# Patient Record
Sex: FEMALE | Race: OTHER | NOT HISPANIC OR LATINO
[De-identification: names, ages, dates, MRNs, and addresses within clinical notes are randomized per-mention and may not be internally consistent; named-entity substitution may affect disease eponyms.]

---

## 2022-03-04 ENCOUNTER — RESULT REVIEW (OUTPATIENT)
Age: 57
End: 2022-03-04

## 2022-03-08 ENCOUNTER — RESULT REVIEW (OUTPATIENT)
Age: 57
End: 2022-03-08

## 2022-03-25 ENCOUNTER — NON-APPOINTMENT (OUTPATIENT)
Age: 57
End: 2022-03-25

## 2022-03-25 PROBLEM — Z00.00 ENCOUNTER FOR PREVENTIVE HEALTH EXAMINATION: Status: ACTIVE | Noted: 2022-03-25

## 2022-03-30 ENCOUNTER — NON-APPOINTMENT (OUTPATIENT)
Age: 57
End: 2022-03-30

## 2022-03-30 ENCOUNTER — APPOINTMENT (OUTPATIENT)
Dept: GYNECOLOGIC ONCOLOGY | Facility: CLINIC | Age: 57
End: 2022-03-30
Payer: COMMERCIAL

## 2022-03-30 VITALS
TEMPERATURE: 97.3 F | HEART RATE: 98 BPM | OXYGEN SATURATION: 96 % | DIASTOLIC BLOOD PRESSURE: 85 MMHG | SYSTOLIC BLOOD PRESSURE: 132 MMHG | HEIGHT: 63 IN | BODY MASS INDEX: 40.75 KG/M2 | WEIGHT: 230 LBS

## 2022-03-30 DIAGNOSIS — Z78.9 OTHER SPECIFIED HEALTH STATUS: ICD-10-CM

## 2022-03-30 DIAGNOSIS — N93.9 ABNORMAL UTERINE AND VAGINAL BLEEDING, UNSPECIFIED: ICD-10-CM

## 2022-03-30 DIAGNOSIS — R93.89 ABNORMAL FINDINGS ON DIAGNOSTIC IMAGING OF OTHER SPECIFIED BODY STRUCTURES: ICD-10-CM

## 2022-03-30 PROCEDURE — 99205 OFFICE O/P NEW HI 60 MIN: CPT

## 2022-03-30 NOTE — PHYSICAL EXAM
[Normal] : Recto-Vaginal Exam: Normal [Fully active, able to carry on all pre-disease performance without restriction] : Status 0 - Fully active, able to carry on all pre-disease performance without restriction [Chaperone Present] : A chaperone was present in the examining room during all aspects of the physical examination

## 2022-03-30 NOTE — HISTORY OF PRESENT ILLNESS
[FreeTextEntry1] : Problem\par 1) Endometrial adenocarcinoma, FIGO G2\par \par Previous Therapy\par 1) Pap 3/17/2022\par    a) PALLAVI, favor neoplastic cells HPV negative \par 2) EMB  3/8/2021\par    a) Endometrial adenocarcinoma, FIGO G2\par 3) Pelvic US 3/4/2022\par    a) Uterus 7.5cm, endometrial thickened 3.6cm, \par \par 55 yo referred by [facility] for newly diagnosed endometrial adenocarcinoma FIGO G2.  Patient went 4 years without a menses [id number], has irregular menses whole life. Has never Patient started with 6/2021, 1/2022 had very heavy bleeding with syncope. Now is having daily spotting, light bleeding. Had not been seeing gyn for many years, so presented to [facility] in march for an annual exam. \par \par \par Mammogram 2022 wnl\par colonoscopy to be done once this is evaluated.

## 2022-03-30 NOTE — DISCUSSION/SUMMARY
[Reviewed Clinical Lab Test(s)] : Results of clinical tests were reviewed. [Reviewed Radiology Report(s)] : Radiology reports were reviewed. [Pre Op] : The differential diagnosis was discussed in detail. The indications, risks, benefits and alternatives were discussed. [unfilled] expressed an understanding of the treatment rationale and her questions were answered to her apparent satisfaction. [FreeTextEntry2] : Grade 2 Endometrial Cancer [FreeTextEntry1] : I discussed with the patient with the aid of diagrams, reviewed the findings on history and physical examination, and reviewed available imaging studies in detail. Pathogenesis of endometrial cancer was explained. \par \par Recommendation is for surgical removal of the uterus, bilateral fallopian tubes and ovaries. Different surgical approaches discussed including minimally invasive and open approaches. I recommend advanced laparoscopic robotic assisted total hysterectomy and bilateral salpingo-oophorectomy with sentinel lymph node dissection. The NCCN guidelines with regards to endometrial cancer staging were discussed. If sentinel lymph node biopsy is unsuccessful, or if the sentinel lymph node is found to be positive for disease, a full lymph node dissection will be performed on that side. \par \par Complications that include, but are not limited to: bleeding, infection, injury to other organs including bowel, bladder, ureters, blood vessels, nerves; infections, blood clots, lymphedema, pneumonia, wound complications and prolonged hospital stay have all been discussed with the patient. Whenever minimally invasive surgery is attempted, there is a chance of needing to convert to laparotomy. The risk of occult injury requiring additional surgery also discussed. I have also provided her with the diagrams.  \par \par Surgical scheduling was discussed and instructions for optimization prior to surgery were given. will follow the Enhanced Recovery After Surgery (ERAS) protocol.  \par []No aspirin or NSAID products for 1 week prior. \par []She will choose a surgical date.\par []Slide review \par []medical clearance. \par Will obtain additional imaging, CT abdomen/pelvis and chest x-ray prior to surgery to assess for disease outside of the uterus.\par \par The total encounter time was 80 minutes, of which over 50% was spent face-to-face, examining and counseling the patient, or coordinating care\par

## 2022-04-04 ENCOUNTER — RESULT REVIEW (OUTPATIENT)
Age: 57
End: 2022-04-04

## 2022-04-06 ENCOUNTER — RESULT REVIEW (OUTPATIENT)
Age: 57
End: 2022-04-06

## 2022-04-06 ENCOUNTER — APPOINTMENT (OUTPATIENT)
Dept: CT IMAGING | Facility: CLINIC | Age: 57
End: 2022-04-06

## 2022-04-06 ENCOUNTER — OUTPATIENT (OUTPATIENT)
Dept: OUTPATIENT SERVICES | Facility: HOSPITAL | Age: 57
LOS: 1 days | End: 2022-04-06

## 2022-04-06 ENCOUNTER — OUTPATIENT (OUTPATIENT)
Dept: OUTPATIENT SERVICES | Facility: HOSPITAL | Age: 57
LOS: 1 days | End: 2022-04-06
Payer: COMMERCIAL

## 2022-04-06 DIAGNOSIS — R93.89 ABNORMAL FINDINGS ON DIAGNOSTIC IMAGING OF OTHER SPECIFIED BODY STRUCTURES: ICD-10-CM

## 2022-04-06 DIAGNOSIS — N93.9 ABNORMAL UTERINE AND VAGINAL BLEEDING, UNSPECIFIED: ICD-10-CM

## 2022-04-06 DIAGNOSIS — C54.1 MALIGNANT NEOPLASM OF ENDOMETRIUM: ICD-10-CM

## 2022-04-06 PROCEDURE — 74177 CT ABD & PELVIS W/CONTRAST: CPT | Mod: 26

## 2022-04-07 ENCOUNTER — NON-APPOINTMENT (OUTPATIENT)
Age: 57
End: 2022-04-07

## 2022-04-08 LAB — SURGICAL PATHOLOGY STUDY: SIGNIFICANT CHANGE UP

## 2022-04-16 ENCOUNTER — LABORATORY RESULT (OUTPATIENT)
Age: 57
End: 2022-04-16

## 2022-04-18 ENCOUNTER — TRANSCRIPTION ENCOUNTER (OUTPATIENT)
Age: 57
End: 2022-04-18

## 2022-04-18 VITALS
HEIGHT: 63 IN | TEMPERATURE: 98 F | DIASTOLIC BLOOD PRESSURE: 79 MMHG | WEIGHT: 236.34 LBS | RESPIRATION RATE: 16 BRPM | SYSTOLIC BLOOD PRESSURE: 127 MMHG | OXYGEN SATURATION: 100 % | HEART RATE: 83 BPM

## 2022-04-18 RX ORDER — DOCUSATE SODIUM 100 MG/1
100 CAPSULE ORAL TWICE DAILY
Qty: 20 | Refills: 1 | Status: ACTIVE | COMMUNITY
Start: 2022-04-18 | End: 1900-01-01

## 2022-04-18 RX ORDER — OXYCODONE AND ACETAMINOPHEN 5; 325 MG/1; MG/1
5-325 TABLET ORAL
Qty: 6 | Refills: 0 | Status: ACTIVE | COMMUNITY
Start: 2022-04-18 | End: 1900-01-01

## 2022-04-18 RX ORDER — IBUPROFEN 800 MG/1
800 TABLET, FILM COATED ORAL 3 TIMES DAILY
Qty: 30 | Refills: 0 | Status: ACTIVE | COMMUNITY
Start: 2022-04-18 | End: 1900-01-01

## 2022-04-18 NOTE — ASU PATIENT PROFILE, ADULT - REASON FOR ADMISSION, PROFILE
robotic assisted total hysterectomy B/L salpingo oophorectomy sentinely lymph node biopses pelvic para aortic

## 2022-04-18 NOTE — ASU PATIENT PROFILE, ADULT - FALL HARM RISK - UNIVERSAL INTERVENTIONS
Bed in lowest position, wheels locked, appropriate side rails in place/Call bell, personal items and telephone in reach/Instruct patient to call for assistance before getting out of bed or chair/Non-slip footwear when patient is out of bed/[place] to call system/Physically safe environment - no spills, clutter or unnecessary equipment/Purposeful Proactive Rounding/Room/bathroom lighting operational, light cord in reach

## 2022-04-19 ENCOUNTER — TRANSCRIPTION ENCOUNTER (OUTPATIENT)
Age: 57
End: 2022-04-19

## 2022-04-19 ENCOUNTER — RESULT REVIEW (OUTPATIENT)
Age: 57
End: 2022-04-19

## 2022-04-19 ENCOUNTER — APPOINTMENT (OUTPATIENT)
Dept: GYNECOLOGIC ONCOLOGY | Facility: HOSPITAL | Age: 57
End: 2022-04-19

## 2022-04-19 ENCOUNTER — OUTPATIENT (OUTPATIENT)
Dept: OUTPATIENT SERVICES | Facility: HOSPITAL | Age: 57
LOS: 1 days | Discharge: HOME | End: 2022-04-19
Payer: COMMERCIAL

## 2022-04-19 VITALS
OXYGEN SATURATION: 100 % | HEART RATE: 98 BPM | SYSTOLIC BLOOD PRESSURE: 150 MMHG | DIASTOLIC BLOOD PRESSURE: 89 MMHG | RESPIRATION RATE: 25 BRPM

## 2022-04-19 DIAGNOSIS — Z98.891 HISTORY OF UTERINE SCAR FROM PREVIOUS SURGERY: Chronic | ICD-10-CM

## 2022-04-19 LAB
BLD GP AB SCN SERPL QL: NEGATIVE — SIGNIFICANT CHANGE UP
GLUCOSE BLDC GLUCOMTR-MCNC: 119 MG/DL — HIGH (ref 70–99)
RH IG SCN BLD-IMP: POSITIVE — SIGNIFICANT CHANGE UP

## 2022-04-19 PROCEDURE — 88304 TISSUE EXAM BY PATHOLOGIST: CPT | Mod: 26

## 2022-04-19 PROCEDURE — 86901 BLOOD TYPING SEROLOGIC RH(D): CPT

## 2022-04-19 PROCEDURE — 88331 PATH CONSLTJ SURG 1 BLK 1SPC: CPT | Mod: 26

## 2022-04-19 PROCEDURE — 88342 IMHCHEM/IMCYTCHM 1ST ANTB: CPT | Mod: 26,59

## 2022-04-19 PROCEDURE — 88305 TISSUE EXAM BY PATHOLOGIST: CPT

## 2022-04-19 PROCEDURE — 82962 GLUCOSE BLOOD TEST: CPT

## 2022-04-19 PROCEDURE — 86900 BLOOD TYPING SEROLOGIC ABO: CPT

## 2022-04-19 PROCEDURE — 38572 LAPAROSCOPY LYMPHADENECTOMY: CPT | Mod: AS

## 2022-04-19 PROCEDURE — 88112 CYTOPATH CELL ENHANCE TECH: CPT | Mod: 26

## 2022-04-19 PROCEDURE — 38900 IO MAP OF SENT LYMPH NODE: CPT

## 2022-04-19 PROCEDURE — 38900 IO MAP OF SENT LYMPH NODE: CPT | Mod: AS

## 2022-04-19 PROCEDURE — 58575 LAPS TOT HYST RESJ MAL: CPT

## 2022-04-19 PROCEDURE — 88360 TUMOR IMMUNOHISTOCHEM/MANUAL: CPT

## 2022-04-19 PROCEDURE — 88342 IMHCHEM/IMCYTCHM 1ST ANTB: CPT

## 2022-04-19 PROCEDURE — S2900: CPT

## 2022-04-19 PROCEDURE — 58575 LAPS TOT HYST RESJ MAL: CPT | Mod: AS

## 2022-04-19 PROCEDURE — 88305 TISSUE EXAM BY PATHOLOGIST: CPT | Mod: 26

## 2022-04-19 PROCEDURE — 38572 LAPAROSCOPY LYMPHADENECTOMY: CPT

## 2022-04-19 PROCEDURE — 88360 TUMOR IMMUNOHISTOCHEM/MANUAL: CPT | Mod: 26

## 2022-04-19 PROCEDURE — 88331 PATH CONSLTJ SURG 1 BLK 1SPC: CPT

## 2022-04-19 PROCEDURE — 88360 TUMOR IMMUNOHISTOCHEM/MANUAL: CPT | Mod: 26,59

## 2022-04-19 PROCEDURE — 88305 TISSUE EXAM BY PATHOLOGIST: CPT | Mod: 26,59

## 2022-04-19 PROCEDURE — 88309 TISSUE EXAM BY PATHOLOGIST: CPT

## 2022-04-19 PROCEDURE — 88341 IMHCHEM/IMCYTCHM EA ADD ANTB: CPT | Mod: 26,59

## 2022-04-19 PROCEDURE — 88341 IMHCHEM/IMCYTCHM EA ADD ANTB: CPT

## 2022-04-19 PROCEDURE — 88309 TISSUE EXAM BY PATHOLOGIST: CPT | Mod: 26

## 2022-04-19 PROCEDURE — 38571 LAPAROSCOPY LYMPHADENECTOMY: CPT | Mod: 50

## 2022-04-19 PROCEDURE — 88112 CYTOPATH CELL ENHANCE TECH: CPT

## 2022-04-19 PROCEDURE — 88304 TISSUE EXAM BY PATHOLOGIST: CPT

## 2022-04-19 PROCEDURE — 86850 RBC ANTIBODY SCREEN: CPT

## 2022-04-19 RX ORDER — POLYETHYLENE GLYCOL 3350 17 G/17G
17 POWDER, FOR SOLUTION ORAL ONCE
Refills: 0 | Status: DISCONTINUED | OUTPATIENT
Start: 2022-04-19 | End: 2022-04-19

## 2022-04-19 RX ORDER — OXYCODONE HYDROCHLORIDE 5 MG/1
5 TABLET ORAL ONCE
Refills: 0 | Status: DISCONTINUED | OUTPATIENT
Start: 2022-04-19 | End: 2022-04-19

## 2022-04-19 RX ORDER — SIMETHICONE 80 MG/1
80 TABLET, CHEWABLE ORAL ONCE
Refills: 0 | Status: DISCONTINUED | OUTPATIENT
Start: 2022-04-19 | End: 2022-04-19

## 2022-04-19 RX ORDER — OXYCODONE HYDROCHLORIDE 5 MG/1
10 TABLET ORAL ONCE
Refills: 0 | Status: DISCONTINUED | OUTPATIENT
Start: 2022-04-19 | End: 2022-04-19

## 2022-04-19 RX ORDER — KETOROLAC TROMETHAMINE 30 MG/ML
30 SYRINGE (ML) INJECTION ONCE
Refills: 0 | Status: DISCONTINUED | OUTPATIENT
Start: 2022-04-19 | End: 2022-04-19

## 2022-04-19 RX ORDER — CELECOXIB 200 MG/1
400 CAPSULE ORAL ONCE
Refills: 0 | Status: COMPLETED | OUTPATIENT
Start: 2022-04-19 | End: 2022-04-19

## 2022-04-19 RX ORDER — GABAPENTIN 400 MG/1
300 CAPSULE ORAL ONCE
Refills: 0 | Status: COMPLETED | OUTPATIENT
Start: 2022-04-19 | End: 2022-04-19

## 2022-04-19 RX ORDER — ONDANSETRON 8 MG/1
8 TABLET, FILM COATED ORAL ONCE
Refills: 0 | Status: DISCONTINUED | OUTPATIENT
Start: 2022-04-19 | End: 2022-04-19

## 2022-04-19 RX ORDER — SODIUM CHLORIDE 9 MG/ML
1000 INJECTION, SOLUTION INTRAVENOUS
Refills: 0 | Status: DISCONTINUED | OUTPATIENT
Start: 2022-04-19 | End: 2022-04-19

## 2022-04-19 RX ORDER — ACETAMINOPHEN 500 MG
1000 TABLET ORAL ONCE
Refills: 0 | Status: COMPLETED | OUTPATIENT
Start: 2022-04-19 | End: 2022-04-19

## 2022-04-19 RX ORDER — ACETAMINOPHEN 500 MG
1000 TABLET ORAL ONCE
Refills: 0 | Status: DISCONTINUED | OUTPATIENT
Start: 2022-04-19 | End: 2022-04-19

## 2022-04-19 RX ORDER — HEPARIN SODIUM 5000 [USP'U]/ML
5000 INJECTION INTRAVENOUS; SUBCUTANEOUS ONCE
Refills: 0 | Status: COMPLETED | OUTPATIENT
Start: 2022-04-19 | End: 2022-04-19

## 2022-04-19 RX ORDER — HYDROMORPHONE HYDROCHLORIDE 2 MG/ML
0.5 INJECTION INTRAMUSCULAR; INTRAVENOUS; SUBCUTANEOUS
Refills: 0 | Status: DISCONTINUED | OUTPATIENT
Start: 2022-04-19 | End: 2022-04-19

## 2022-04-19 RX ADMIN — HEPARIN SODIUM 5000 UNIT(S): 5000 INJECTION INTRAVENOUS; SUBCUTANEOUS at 07:20

## 2022-04-19 RX ADMIN — GABAPENTIN 300 MILLIGRAM(S): 400 CAPSULE ORAL at 07:20

## 2022-04-19 RX ADMIN — Medication 1000 MILLIGRAM(S): at 07:20

## 2022-04-19 RX ADMIN — Medication 1000 MILLIGRAM(S): at 07:23

## 2022-04-19 RX ADMIN — CELECOXIB 400 MILLIGRAM(S): 200 CAPSULE ORAL at 07:23

## 2022-04-19 RX ADMIN — CELECOXIB 400 MILLIGRAM(S): 200 CAPSULE ORAL at 07:19

## 2022-04-19 NOTE — BRIEF OPERATIVE NOTE - SPECIMENS
See attending dictation
Cervix, Uterus bilateral fallopian tubes and ovaries, bilateral pelvic sentinel LND and omentum.

## 2022-04-19 NOTE — BRIEF OPERATIVE NOTE - NSICDXBRIEFPOSTOP_GEN_ALL_CORE_FT
POST-OP DIAGNOSIS:  Endometrial cancer 19-Apr-2022 12:13:57  Kelvin Sheppard  
POST-OP DIAGNOSIS:  Endometrial cancer 19-Apr-2022 12:13:57  Kelvin Sheppard

## 2022-04-19 NOTE — DISCHARGE NOTE NURSING/CASE MANAGEMENT/SOCIAL WORK - PATIENT PORTAL LINK FT
You can access the FollowMyHealth Patient Portal offered by [facility] by registering at the following website: [email or web address]. By joining [facility]’s FollowMyHealth portal, you will also be able to view your health information using other applications (apps) compatible with our system.

## 2022-04-19 NOTE — ASU DISCHARGE PLAN (ADULT/PEDIATRIC) - PROCEDURE
R/A TLH, BSO pelvic [place] LND and omentectomy. R/A TLH, BSO pelvic [place] LND and omentectomy.

## 2022-04-19 NOTE — BRIEF OPERATIVE NOTE - NSICDXBRIEFPROCEDURE_GEN_ALL_CORE_FT
PROCEDURES:  Robot-assisted laparoscopic total hysterectomy using da Tam Xi with cystoscopy 19-Apr-2022 12:12:12  Kelvin Sheppard  Robot-assisted laparoscopic salpingoopherectomy 19-Apr-2022 12:12:28  Kelvin Sheppard  Robot-assisted laparoscopic omentectomy 19-Apr-2022 12:12:43  Kelvin Sheppard  Robot-assisted pelvic lymphadenectomy 19-Apr-2022 12:13:20 Bilateral sentinel Kelvin Sheppard  
PROCEDURES:  Robot-assisted laparoscopic total hysterectomy using da Tam Xi with cystoscopy 19-Apr-2022 12:12:12  Kelvin Sheppard  Robot-assisted laparoscopic salpingoopherectomy 19-Apr-2022 12:12:28  Kelvin Sheppard  Robot-assisted laparoscopic omentectomy 19-Apr-2022 12:12:43  Kelvin Sheppard  Robot-assisted pelvic lymphadenectomy 19-Apr-2022 12:13:20 Bilateral sentinel Kelvin Sheppard

## 2022-04-19 NOTE — PROGRESS NOTE ADULT - SUBJECTIVE AND OBJECTIVE BOX
GYN POST-OPERATIVE CHECK  Patient seen at bedside.  Pain controlled. Tolerating sips.  No OOB yet.  Gordon in place.    Denies CP, palpitations, SOB, fever, chills, nausea, vomiting.    Vital Signs Last 24 Hrs  T(C): 36.8 (19 Apr 2022 12:30), Max: 36.8 (19 Apr 2022 12:30)  T(F): 98.2 (19 Apr 2022 12:30), Max: 98.2 (19 Apr 2022 12:30)  HR: 98 (19 Apr 2022 17:13) (85 - 98)  BP: 150/89 (19 Apr 2022 17:13) (143/81 - 163/98)  BP(mean): 114 (19 Apr 2022 17:13) (106 - 126)  RR: 25 (19 Apr 2022 17:13) (13 - 25)  SpO2: 100% (19 Apr 2022 17:13) (93% - 100%)    Physical Exam:  Gen: No Acute Distress  Pulm: Clear to auscultation bilaterally  GI: soft, appropriately tender, non-distended, +BS, no rebound, no guarding.  Dressing C/D/I  : Gordon in place  Ext: SCDs in place, no edema/erythema/tenderness    I&O's Summary    19 Apr 2022 07:01  -  19 Apr 2022 17:45  --------------------------------------------------------  IN: 550 mL / OUT: 0 mL / NET: 550 mL      MEDICATIONS  (STANDING):  acetaminophen     Tablet .. 1000 milliGRAM(s) Oral Once  ketorolac   Injectable 30 milliGRAM(s) IV Push Once  lactated ringers. 1000 milliLiter(s) (125 mL/Hr) IV Continuous <Continuous>    MEDICATIONS  (PRN):  acetaminophen     Tablet .. 1000 milliGRAM(s) Oral Once PRN Mild Pain (1 - 3)  HYDROmorphone  Injectable 0.5 milliGRAM(s) IV Push every 15 minutes PRN Severe Pain (7 - 10)  ondansetron    Tablet 8 milliGRAM(s) Oral Once PRN Nausea and/or Vomiting  oxyCODONE    IR 5 milliGRAM(s) Oral Once PRN Moderate Pain (4 - 6)  oxyCODONE    IR 10 milliGRAM(s) Oral Once PRN Severe Pain (7 - 10)  polyethylene glycol 3350 17 Gram(s) Oral Once PRN Constipation  simethicone 80 milliGRAM(s) Chew Once PRN Gas    Allergies    No Known Allergies    Intolerances        LABS:

## 2022-04-19 NOTE — ASU DISCHARGE PLAN (ADULT/PEDIATRIC) - CARE PROVIDER_API CALL
Erlinda Rizzo (DO)  Gynecologic Oncology; Obstetrics and Gynecology  [address] [place] [address]  Phone: [phone]  Fax: [phone]  Follow Up Time:

## 2022-04-19 NOTE — DISCHARGE NOTE NURSING/CASE MANAGEMENT/SOCIAL WORK - NSDCPEFALRISK_GEN_ALL_CORE
For information on Fall & Injury Prevention, visit: [email or web address] OR  [email or web address] OR  https://www.cdc.gov/steadi/patient.html

## 2022-04-19 NOTE — BRIEF OPERATIVE NOTE - OPERATION/FINDINGS
See attending dictation
R/A TLH, BSO pelvic [place] LND and omentectomy.  Normal abdominal anatomy, 10 weeks size uterus, normal adnexa bilateral.  inflamed tissue around the cul-de-sac / sacrouterine ligaments - sent to pathology: Frozen, Fibrotic tissue with chronic inflammation.  Due to Grade 3 histology we also removed the omentum.  Vaginal cff closed robotically.

## 2022-04-19 NOTE — PROGRESS NOTE ADULT - ASSESSMENT
56y Female POD#0 s/p R/A L/S TLH, BSO, sentinel PLND and omentectomy fir endometrial Ca grade 3,                                    1. Neuro/Pain:  Tylenol, Toradol and Dilaudid  2  CV:   VS per routine  3. Pulm: Encourage ISS  4. GI: LRD  5. : voiding  6. Heme: --  7. ID: --  8. DVT ppx: SCDs  9. Dispo: Likely POD#0

## 2022-04-21 LAB — NON-GYNECOLOGICAL CYTOLOGY STUDY: SIGNIFICANT CHANGE UP

## 2022-04-25 LAB — SURGICAL PATHOLOGY STUDY: SIGNIFICANT CHANGE UP

## 2022-04-28 ENCOUNTER — APPOINTMENT (OUTPATIENT)
Dept: GYNECOLOGIC ONCOLOGY | Facility: CLINIC | Age: 57
End: 2022-04-28
Payer: COMMERCIAL

## 2022-05-02 ENCOUNTER — APPOINTMENT (OUTPATIENT)
Dept: GYNECOLOGIC ONCOLOGY | Facility: CLINIC | Age: 57
End: 2022-05-02
Payer: COMMERCIAL

## 2022-05-02 VITALS
OXYGEN SATURATION: 99 % | DIASTOLIC BLOOD PRESSURE: 81 MMHG | RESPIRATION RATE: 18 BRPM | BODY MASS INDEX: 40.74 KG/M2 | WEIGHT: 230 LBS | HEART RATE: 89 BPM | TEMPERATURE: 97.8 F | SYSTOLIC BLOOD PRESSURE: 129 MMHG

## 2022-05-02 PROBLEM — C54.1 MALIGNANT NEOPLASM OF ENDOMETRIUM: Chronic | Status: ACTIVE | Noted: 2022-04-19

## 2022-05-02 PROCEDURE — 99024 POSTOP FOLLOW-UP VISIT: CPT

## 2022-05-02 NOTE — DISCUSSION/SUMMARY
[Reviewed Clinical Lab Test(s)] : Results of clinical tests were reviewed. [Reviewed Radiology Report(s)] : Radiology reports were reviewed. [Pre Op] : The differential diagnosis was discussed in detail. The indications, risks, benefits and alternatives were discussed. [unfilled] expressed an understanding of the treatment rationale and her questions were answered to her apparent satisfaction. [FreeTextEntry2] : Grade 2 Endometrial Cancer [FreeTextEntry1] : \par - Patient healing well\par - Stage 3B Endometrioid Adenocarcinoma discussed with Dr. Rizzo. We will discuss her case during TB to decide on Pelvic RT +/- Chemo\par - Pathology discussed with patient\par - Follow up 5/11/22 for treatment planning with Dr. Rizzo

## 2022-05-02 NOTE — HISTORY OF PRESENT ILLNESS
[FreeTextEntry1] : Problem\par 1) Endometrial adenocarcinoma, FIGO G3\par 2) MSI -Intact \par \par Previous Therapy\par 1) Pap 3/17/2022\par    a) PALLAVI, favor neoplastic cells HPV negative \par 2) EMB  3/8/2021\par    a) Endometrial adenocarcinoma, FIGO G2\par 3) Pelvic US 3/4/2022\par    a) Uterus 7.5cm, endometrial thickened 3.6cm, \par 4) S/P R/A TLH, BSO pelvic [place] LND and omentectomy 4/19/22\par    a) Posterior cul de sac lesion:\par - Fibroadipose tissue with chronic inflammatory cells and freezing artifact.\par \par    b)  Posterior cul de sac lesion #2:\par - Metastatic adenocarcinoma.\par \par Note: Immunostains demonstrate that the neoplastic cells are PAX8 and CK7\par positive and lack expression of GATA3, supporting the above diagnosis.\par \par    c)  Uterus, cervix, bilateral fallopian tubes and ovaries:\par - Endometrioid adenocarcinoma, FIGO grade 3.\par - Myometrial invasion is present (>50%).\par - Positive for lymphovascular invasion.\par - Right fallopian tube with a foci suspicious for metastatic carcinoma.\par - Left fallopian tube and right and left ovaries negative for tumor.\par \par    d) Left internal iliac sentinel lymph node:\par - One lymph node negative for tumor (0/1).\par \par Note: Immunostain for cytokeratine (AE1/3) is negative.\par \par    e)  Right external iliac sentinel lymph node:\par - Two lymph nodes negative for tumor (0/2).\par \par Note: Immunostain for cytokeratine (AE1/3) is negative.\par \par   f)  Omentum:\par - Adipose tissue negative for tumor.\par \par 1 week post op.  Pt doing well, no complaints. She denies pain or having to use pain meds. She reports good appetite and normal BMs. Denies any vaginal bleeding \par \par Mammogram 2022 wnl\par colonoscopy to be done once this is evaluated.

## 2022-05-02 NOTE — PHYSICAL EXAM
[Chaperone Present] : A chaperone was present in the examining room during all aspects of the physical examination [Normal] : No focal neurologic defects observed [Fully active, able to carry on all pre-disease performance without restriction] : Status 0 - Fully active, able to carry on all pre-disease performance without restriction [de-identified] : lap c/d/i, abdomen soft NT, ND

## 2022-05-11 ENCOUNTER — APPOINTMENT (OUTPATIENT)
Dept: GYNECOLOGIC ONCOLOGY | Facility: CLINIC | Age: 57
End: 2022-05-11

## 2022-05-11 VITALS
HEIGHT: 63 IN | TEMPERATURE: 97.3 F | HEART RATE: 96 BPM | OXYGEN SATURATION: 100 % | SYSTOLIC BLOOD PRESSURE: 131 MMHG | WEIGHT: 230 LBS | DIASTOLIC BLOOD PRESSURE: 84 MMHG | BODY MASS INDEX: 40.75 KG/M2

## 2022-05-11 PROCEDURE — 99024 POSTOP FOLLOW-UP VISIT: CPT

## 2022-05-23 ENCOUNTER — APPOINTMENT (OUTPATIENT)
Dept: GYNECOLOGIC ONCOLOGY | Facility: CLINIC | Age: 57
End: 2022-05-23

## 2022-06-06 ENCOUNTER — LABORATORY RESULT (OUTPATIENT)
Age: 57
End: 2022-06-06

## 2022-06-06 ENCOUNTER — APPOINTMENT (OUTPATIENT)
Dept: GYNECOLOGIC ONCOLOGY | Facility: CLINIC | Age: 57
End: 2022-06-06

## 2022-06-06 VITALS
BODY MASS INDEX: 42.35 KG/M2 | HEART RATE: 94 BPM | DIASTOLIC BLOOD PRESSURE: 86 MMHG | WEIGHT: 239 LBS | SYSTOLIC BLOOD PRESSURE: 142 MMHG | HEIGHT: 63 IN | TEMPERATURE: 97.6 F | OXYGEN SATURATION: 97 %

## 2022-06-06 DIAGNOSIS — C54.1 MALIGNANT NEOPLASM OF ENDOMETRIUM: ICD-10-CM

## 2022-06-06 PROCEDURE — XXXXX: CPT | Mod: 1L

## 2022-06-06 RX ORDER — ONDANSETRON 8 MG/1
8 TABLET ORAL EVERY 8 HOURS
Qty: 90 | Refills: 3 | Status: ACTIVE | COMMUNITY
Start: 2022-06-06 | End: 1900-01-01

## 2022-06-06 RX ORDER — PROCHLORPERAZINE MALEATE 10 MG/1
10 TABLET ORAL EVERY 6 HOURS
Qty: 30 | Refills: 3 | Status: ACTIVE | COMMUNITY
Start: 2022-06-06 | End: 1900-01-01

## 2022-06-07 ENCOUNTER — NON-APPOINTMENT (OUTPATIENT)
Age: 57
End: 2022-06-07

## 2022-06-07 ENCOUNTER — APPOINTMENT (OUTPATIENT)
Dept: RADIATION ONCOLOGY | Facility: CLINIC | Age: 57
End: 2022-06-07
Payer: COMMERCIAL

## 2022-06-07 LAB
ALBUMIN SERPL ELPH-MCNC: 4.1 G/DL
ALP BLD-CCNC: 67 U/L
ALT SERPL-CCNC: 9 U/L
ANION GAP SERPL CALC-SCNC: 11 MMOL/L
AST SERPL-CCNC: 15 U/L
BASOPHILS # BLD AUTO: 0.03 K/UL
BASOPHILS NFR BLD AUTO: 0.6 %
BILIRUB SERPL-MCNC: 0.3 MG/DL
BUN SERPL-MCNC: 18 MG/DL
CALCIUM SERPL-MCNC: 9.5 MG/DL
CANCER AG125 SERPL-ACNC: 3 U/ML
CHLORIDE SERPL-SCNC: 107 MMOL/L
CO2 SERPL-SCNC: 23 MMOL/L
CREAT SERPL-MCNC: 0.72 MG/DL
EGFR: 97 ML/MIN/1.73M2
EOSINOPHIL # BLD AUTO: 0.09 K/UL
EOSINOPHIL NFR BLD AUTO: 1.7 %
GLUCOSE SERPL-MCNC: 93 MG/DL
HCT VFR BLD CALC: 35.2 %
HGB BLD-MCNC: 9 G/DL
IMM GRANULOCYTES NFR BLD AUTO: 0.2 %
LYMPHOCYTES # BLD AUTO: 1.71 K/UL
LYMPHOCYTES NFR BLD AUTO: 33.1 %
MAGNESIUM SERPL-MCNC: 2.1 MG/DL
MAN DIFF?: NORMAL
MCHC RBC-ENTMCNC: 16.9 PG
MCHC RBC-ENTMCNC: 25.6 GM/DL
MCV RBC AUTO: 66 FL
MONOCYTES # BLD AUTO: 0.31 K/UL
MONOCYTES NFR BLD AUTO: 6 %
NEUTROPHILS # BLD AUTO: 3.02 K/UL
NEUTROPHILS NFR BLD AUTO: 58.4 %
PLATELET # BLD AUTO: 355 K/UL
POTASSIUM SERPL-SCNC: 4.4 MMOL/L
PROT SERPL-MCNC: 7 G/DL
RBC # BLD: 5.33 M/UL
RBC # FLD: 22.1 %
SODIUM SERPL-SCNC: 141 MMOL/L
WBC # FLD AUTO: 5.17 K/UL

## 2022-06-07 PROCEDURE — 99024 POSTOP FOLLOW-UP VISIT: CPT

## 2022-06-07 NOTE — HISTORY OF PRESENT ILLNESS
[Home] : at home, [unfilled] , at the time of the visit. [Medical Office: ([facility])___] : at the medical office located in  [Verbal consent obtained from patient] : the patient, [unfilled] [FreeTextEntry1] : Ms.ODESSA KEARNEY is a 56 year old female with diagnosis of Metastatic Endometrial adenocarcinoma Stage 3A, FIGO G3, myometrial invasion >50% (6mm), positive for LVI, LN negative for tumor, all margins negative for invasive carcinoma. S/P R/A TLH, BSO pelvic [place] LND and omentectomy 4/19/22 by .\par \par 06/07/2022: NEW CONSULT\par Patient has no complaints, denies any bladder or bowel symptoms. Educated on the procedure, treatment and side-effects of radiation treatment. All concerns addressed.Encouraged on use of Aquaphor/Eucerin BID after treatment.\par \par History of Present Illness\par Problem\par 1) Endometrial adenocarcinoma stage 3A , FIGO G3 \par 2) MSI -Intact \par \par Previous Therapy\par 1) Pap 3/17/2022\par  a) PALLAVI, favor neoplastic cells HPV negative \par 2) EMB 3/8/2021\par  a) Endometrial adenocarcinoma, FIGO G3\par 3) Pelvic US 3/4/2022\par  a) Uterus 7.5cm, endometrial thickened 3.6cm, \par 4) S/P R/A TLH, BSO pelvic [place] LND and omentectomy 4/19/22\par  a) Posterior cul de sac lesion:\par - Fibroadipose tissue with chronic inflammatory cells and freezing artifact.\par  b) Posterior cul de sac lesion #2:\par - Metastatic adenocarcinoma.\par  c) Uterus, cervix, bilateral fallopian tubes and ovaries:\par - Endometrioid adenocarcinoma, FIGO grade 3.\par - Myometrial invasion is present (>50%).\par - Positive for lymphovascular invasion.\par - Right fallopian tube with a foci suspicious for metastatic carcinoma.\par - Left fallopian tube and right and left ovaries negative for tumor.\par d) Left internal iliac sentinel lymph node:\par  e) Right external iliac sentinel lymph node:\par - Two lymph nodes negative for tumor (0/2).\par f) Omentum:\par - Adipose tissue negative for tumor.\par \par CT abdomen/pelvis (04/06/2022):\par IMPRESSION:\par No locally invasive or metastatic disease.\par \par \par Mammogram 2022: WNL\par Colonoscopy to be done once this is evaluated. \par \par Active Problems\par Abnormal uterine bleeding (626.9) (N93.9)\par Endometrial cancer (182.0) (C54.1)\par Thickened endometrium (793.5) (R93.89)\par \par Past Medical History\par History of Patient denies medical problems (V49.89) (Z78.9)\par \par Current Meds\par Colace 100 MG Oral Capsule; TAKE 1 CAPSULE TWICE DAILY AS NEEDED\par Ibuprofen 800 MG Oral Tablet; TAKE 1 TABLET 3 TIMES DAILY WITH FOOD AS NEEDED\par oxycodone-Acetaminophen 5-325 MG Oral Tablet; TAKE 1 TABLET EVERY 6 HOURS\par AS NEEDED FOR PAIN. MDD:4 tablets\par \par Allergies\par No Known Drug Allergies\par

## 2022-06-07 NOTE — REVIEW OF SYSTEMS
[Negative] : Neurological [Hematuria: Grade 0] : Hematuria: Grade 0 [Urinary Incontinence: Grade 0] : Urinary Incontinence: Grade 0  [Urinary Retention: Grade 0] : Urinary Retention: Grade 0 [Urinary Tract Pain: Grade 0] : Urinary Tract Pain: Grade 0 [Urinary Urgency: Grade 0] : Urinary Urgency: Grade 0 [Urinary Frequency: Grade 0] : Urinary Frequency: Grade 0 [Genital Edema: Grade 0] : Genital Edema: Grade 0 [Vaginal Stricture: Grade 0] : Vaginal Stricture: Grade 0 [Vaginal Infection: Grade 0] : Vaginal Infection: Grade 0  [Alopecia: Grade 0] : Alopecia: Grade 0 [Pruritus: Grade 0] : Pruritus: Grade 0 [Skin Atrophy: Grade 0] : Skin Atrophy: Grade 0 [Skin Hyperpigmentation: Grade 0] : Skin Hyperpigmentation: Grade 0 [Skin Induration: Grade 0] : Skin Induration: Grade 0 [Dermatitis Radiation: Grade 0] : Dermatitis Radiation: Grade 0 [Urinary Frequency] : no change in urinary frequency [Vaginal Discharge] : no vaginal discharge [Dysmenorrhea/Abn Vaginal Bleeding] : no dysmenorrhea/abnormal vaginal bleeding

## 2022-06-08 ENCOUNTER — NON-APPOINTMENT (OUTPATIENT)
Age: 57
End: 2022-06-08

## 2022-06-21 ENCOUNTER — NON-APPOINTMENT (OUTPATIENT)
Age: 57
End: 2022-06-21

## 2022-08-05 NOTE — PHYSICAL EXAM
[Chaperone Present] : A chaperone was present in the examining room during all aspects of the physical examination [Normal] : No focal neurologic defects observed [Fully active, able to carry on all pre-disease performance without restriction] : Status 0 - Fully active, able to carry on all pre-disease performance without restriction [de-identified] : lap c/d/i, abdomen soft NT, ND

## 2022-08-05 NOTE — HISTORY OF PRESENT ILLNESS
[FreeTextEntry1] : Problem\par 1) Endometrial adenocarcinoma stage 3A , FIGO G3 5/2022\par 2) MSI -Intact \par \par Previous Therapy\par 1) Pap 3/17/2022\par    a) PALLAVI, favor neoplastic cells HPV negative \par 2) EMB  3/8/2021\par    a) Endometrial adenocarcinoma, FIGO G2\par 3) Pelvic US 3/4/2022\par    a) Uterus 7.5cm, endometrial thickened 3.6cm, \par 4) S/P R/A TLH, BSO pelvic [place] LND and omentectomy 4/19/22\par    a) Posterior cul de sac lesion:\par - Fibroadipose tissue with chronic inflammatory cells and freezing artifact.\par    b)  Posterior cul de sac lesion #2:\par - Metastatic adenocarcinoma.\par    c)  Uterus, cervix, bilateral fallopian tubes and ovaries:\par - Endometrioid adenocarcinoma, FIGO grade 3.\par - Myometrial invasion is present (>50%).\par - Positive for lymphovascular invasion.\par - Right fallopian tube with a foci suspicious for metastatic carcinoma.\par - Left fallopian tube and right and left ovaries negative for tumor.\par d) Left internal iliac sentinel lymph node:\par  e)  Right external iliac sentinel lymph node:\par - Two lymph nodes negative for tumor (0/2).\par f)  Omentum:\par - Adipose tissue negative for tumor.\par \par Here for post op visit and treatment planning for adjuvant treatment of stage 3A FIGO 3 endometrial cancer.  Pt doing well, no complaints. She denies pain or having to use pain meds. She reports good appetite and normal BMs. Denies any vaginal bleeding \par \par Mammogram 2022 wnl\par colonoscopy to be done once this is evaluated.

## 2022-08-05 NOTE — DISCUSSION/SUMMARY
[Reviewed Clinical Lab Test(s)] : Results of clinical tests were reviewed. [Reviewed Radiology Report(s)] : Radiology reports were reviewed. [Pre Op] : The differential diagnosis was discussed in detail. The indications, risks, benefits and alternatives were discussed. [unfilled] expressed an understanding of the treatment rationale and her questions were answered to her apparent satisfaction. [FreeTextEntry2] : Grade 2 Endometrial Cancer [FreeTextEntry1] : Stage 3A Endometrioid Adenocarcinoma s/p optimal surgery\par \par Case discussed at tumor board\par Taxol/Carbo followed by EBRT\par Chemotherapy discussion today\par Consent signed\par pre chemo labs drawn\par WIll arrange first treatment next week

## 2022-10-31 NOTE — DISCUSSION/SUMMARY
[Reviewed Clinical Lab Test(s)] : Results of clinical tests were reviewed. [Reviewed Radiology Report(s)] : Radiology reports were reviewed. [Pre Op] : The differential diagnosis was discussed in detail. The indications, risks, benefits and alternatives were discussed. [unfilled] expressed an understanding of the treatment rationale and her questions were answered to her apparent satisfaction. [FreeTextEntry2] : Grade 2 Endometrial Cancer [FreeTextEntry1] : Stage 3A Endometrioid Adenocarcinoma s/p optimal surgery\par \par Case discussed at tumor board\par EBRT/cisplatin ([place] study) f/b T/C chemotherapy\par Chemotherapy discussion today\par Consent signed\par pre chemo labs drawn\par WIll arrange first treatment next week

## 2022-10-31 NOTE — HISTORY OF PRESENT ILLNESS
[FreeTextEntry1] : Problem\par 1) Endometrial adenocarcinoma stage 3A , FIGO G3 5/2022\par 2) MSI -Intact \par \par Previous Therapy\par 1) Pap 3/17/2022\par    a) PALLAVI, favor neoplastic cells HPV negative \par 2) EMB  3/8/2021\par    a) Endometrial adenocarcinoma, FIGO G2\par 3) Pelvic US 3/4/2022\par    a) Uterus 7.5cm, endometrial thickened 3.6cm, \par 4) S/P R/A TLH, BSO pelvic [place] LND and omentectomy 4/19/22\par    a) Posterior cul de sac lesion:\par - Fibroadipose tissue with chronic inflammatory cells and freezing artifact.\par    b)  Posterior cul de sac lesion #2:\par - Metastatic adenocarcinoma.\par    c)  Uterus, cervix, bilateral fallopian tubes and ovaries:\par - Endometrioid adenocarcinoma, FIGO grade 3.\par - Myometrial invasion is present (>50%).\par - Positive for lymphovascular invasion.\par - Right fallopian tube with a foci suspicious for metastatic carcinoma.\par - Left fallopian tube and right and left ovaries negative for tumor.\par d) Left internal iliac sentinel lymph node:\par  e)  Right external iliac sentinel lymph node:\par - Two lymph nodes negative for tumor (0/2).\par f)  Omentum:\par - Adipose tissue negative for tumor.\par \par Here discussion of treatment planning for adjuvant treatment of stage 3A FIGO 3 endometrial cancer.  Pt has plans for the summer that she does not want to cancel and wants to start treatment in august/sept.\par \par Mammogram 2022 wnl\par colonoscopy to be done once this is evaluated.

## 2022-10-31 NOTE — PHYSICAL EXAM
[Chaperone Present] : A chaperone was present in the examining room during all aspects of the physical examination [Normal] : No focal neurologic defects observed [Fully active, able to carry on all pre-disease performance without restriction] : Status 0 - Fully active, able to carry on all pre-disease performance without restriction [de-identified] : lap c/d/i, abdomen soft NT, ND

## (undated) DEVICE — XI DRAPE ARM

## (undated) DEVICE — UTERINE MANIPULATOR CONMED VCARE MED 34MM

## (undated) DEVICE — VENODYNE/SCD SLEEVE CALF MEDIUM

## (undated) DEVICE — XI OBTURATOR OPTICAL BLADELESS 8MM

## (undated) DEVICE — TIP METZENBAUM SCISSOR MONOPOLAR ENDOCUT (ORANGE)

## (undated) DEVICE — TUBING STRYKER PNEUMOCLEAR SMOKE EVACUATION HIGH FLOW

## (undated) DEVICE — Device

## (undated) DEVICE — WARMING BLANKET UPPER ADULT

## (undated) DEVICE — PACK PERI GYN

## (undated) DEVICE — POSITIONER FOAM EGG CRATE ULNAR 2PCS (PINK)

## (undated) DEVICE — SUT VICRYL 0 27" UR-6

## (undated) DEVICE — TROCAR COVIDIEN VERSAPORT BLADELESS OPTICAL 5MM STANDARD

## (undated) DEVICE — XI ARM FORCEP PROGRASP 8MM

## (undated) DEVICE — RUMI COLPO-PNEUMO OCCLUDER

## (undated) DEVICE — XI ARM SCISSOR MONO CURVED

## (undated) DEVICE — XI TIP COVER

## (undated) DEVICE — FOLEY TRAY 16FR 5CC LF UMETER CLOSED

## (undated) DEVICE — XI ARM FORCEP FENESTRATED BIPOLAR 8MM

## (undated) DEVICE — PACK GYN WDC

## (undated) DEVICE — TROCAR COVIDIEN VERSAONE BLUNT TIP HASSAN 12MM

## (undated) DEVICE — DRSG DERMABOND 0.7ML

## (undated) DEVICE — XI VESSEL SEALER

## (undated) DEVICE — XI DRAPE COLUMN

## (undated) DEVICE — INZII RETRIEVAL SYSTEM 5MM

## (undated) DEVICE — XI ARM NEEDLE DRIVER LARGE

## (undated) DEVICE — SUT VLOC 180 0 12" GS-21 GREEN

## (undated) DEVICE — INSUFFLATION NDL COVIDIEN SURGINEEDLE VERESS 120MM

## (undated) DEVICE — TROCAR APPLIED MEDICAL KII FIOS FIRST ENTRY 5MM X 100MM ADVANCED FIXATION

## (undated) DEVICE — SUT MONOCRYL 4-0 27" PS-2 UNDYED

## (undated) DEVICE — D HELP - CLEARVIEW CLEARIFY SYSTEM

## (undated) DEVICE — NDL SPINAL 22G X 3.5" (BLACK)

## (undated) DEVICE — XI SEAL UNIV 5- 8 MM

## (undated) DEVICE — PREP CHLORAPREP HI-LITE ORANGE 26ML

## (undated) DEVICE — SYR LUER LOK 30CC

## (undated) DEVICE — POSITIONER PINK PAD PIGAZZI SYSTEM XL W ARM PROTECTOR

## (undated) DEVICE — GLV 7 PROTEXIS (WHITE)

## (undated) DEVICE — PREP SCRUB BRUSH W CHG 4%

## (undated) DEVICE — SUT PDS II 2-0 27" SH